# Patient Record
Sex: MALE | Race: WHITE | NOT HISPANIC OR LATINO | Employment: FULL TIME | URBAN - METROPOLITAN AREA
[De-identification: names, ages, dates, MRNs, and addresses within clinical notes are randomized per-mention and may not be internally consistent; named-entity substitution may affect disease eponyms.]

---

## 2017-07-08 ENCOUNTER — OFFICE VISIT (OUTPATIENT)
Dept: URGENT CARE | Facility: MEDICAL CENTER | Age: 57
End: 2017-07-08
Payer: COMMERCIAL

## 2017-07-08 PROCEDURE — 90715 TDAP VACCINE 7 YRS/> IM: CPT

## 2017-07-08 PROCEDURE — 12001 RPR S/N/AX/GEN/TRNK 2.5CM/<: CPT

## 2017-07-08 PROCEDURE — 99213 OFFICE O/P EST LOW 20 MIN: CPT

## 2017-07-15 ENCOUNTER — OFFICE VISIT (OUTPATIENT)
Dept: URGENT CARE | Facility: MEDICAL CENTER | Age: 57
End: 2017-07-15
Payer: COMMERCIAL

## 2017-07-15 PROCEDURE — 99213 OFFICE O/P EST LOW 20 MIN: CPT

## 2023-07-05 ENCOUNTER — OFFICE VISIT (OUTPATIENT)
Dept: URGENT CARE | Facility: CLINIC | Age: 63
End: 2023-07-05
Payer: COMMERCIAL

## 2023-07-05 VITALS
RESPIRATION RATE: 16 BRPM | TEMPERATURE: 97 F | DIASTOLIC BLOOD PRESSURE: 60 MMHG | HEART RATE: 69 BPM | SYSTOLIC BLOOD PRESSURE: 112 MMHG

## 2023-07-05 DIAGNOSIS — T30.0 BURN: Primary | ICD-10-CM

## 2023-07-05 DIAGNOSIS — Z23 NEED FOR TDAP VACCINATION: ICD-10-CM

## 2023-07-05 PROCEDURE — 16020 DRESS/DEBRID P-THICK BURN S: CPT | Performed by: NURSE PRACTITIONER

## 2023-07-05 PROCEDURE — 90715 TDAP VACCINE 7 YRS/> IM: CPT

## 2023-07-05 PROCEDURE — 90471 IMMUNIZATION ADMIN: CPT | Performed by: NURSE PRACTITIONER

## 2023-07-05 PROCEDURE — 99213 OFFICE O/P EST LOW 20 MIN: CPT | Performed by: NURSE PRACTITIONER

## 2023-07-05 RX ORDER — ESCITALOPRAM OXALATE 5 MG/1
5 TABLET ORAL
COMMUNITY
Start: 2023-05-04

## 2023-07-05 RX ORDER — CEPHALEXIN 500 MG/1
500 CAPSULE ORAL EVERY 8 HOURS SCHEDULED
Qty: 15 CAPSULE | Refills: 0 | Status: SHIPPED | OUTPATIENT
Start: 2023-07-05 | End: 2023-07-10

## 2023-07-05 RX ORDER — OMEPRAZOLE 40 MG/1
CAPSULE, DELAYED RELEASE ORAL
COMMUNITY
Start: 2023-05-13

## 2023-07-05 RX ORDER — ROSUVASTATIN CALCIUM 10 MG/1
TABLET, COATED ORAL
COMMUNITY

## 2023-07-05 NOTE — PROGRESS NOTES
[facility]        NAME: Diamond Linn is a 65 y.o. male  : 1960    MRN: [id number]  DATE: 2023  TIME: 12:00 PM    Assessment and Plan   Burn [T30.0]  1. Burn      cephalexin (KEFLEX) 500 mg capsule   2. Need for Tdap vaccination  TDAP VACCINE GREATER THAN OR EQUAL TO 8YO IM        --Bacitracin ointment + non-stick dressing applied. Patient Instructions     --Cover burn with OTC Bacitracin antibiotic ointment and non-stick dressing, changing daily, until scabbed over  --Protect area from further injury. --Fill and start oral antibiotic if increased redness, tenderness, swelling, unusual drainage immediately surrounding burn over the next 3-5 days    Chief Complaint     Chief Complaint   Patient presents with   • Burn     5 days ago burned L arm. Has been using burn cream but looks more red and irritated. History of Present Illness       Here with complaints of burn on left forearm. Occcurred 5 days ago after he accidentally came in contact with hot exhaust manifold while working on car. Applied ice immediately afterwards, washed eventually. Initial blister which has since started to partially scab. No drainage. Skin immediately around burned area seems to be looking a little more red the past few days, so wanted to get it checked just in case. Applying OTC lidocaine burn gel. Tdap 2017. Review of Systems   Review of Systems   Constitutional: Negative for fever. Skin: Positive for wound.          Current Medications       Current Outpatient Medications:   •  cephalexin (KEFLEX) 500 mg capsule, Take 1 capsule (500 mg total) by mouth every 8 (eight) hours for 5 days, Disp: 15 capsule, Rfl: 0  •  Cholecalciferol 50 MCG ( UT) TABS, Take 1 tablet by mouth every morning, Disp: , Rfl:   •  escitalopram (LEXAPRO) 5 mg tablet, Take 5 mg by mouth, Disp: , Rfl:   •  Multiple Vitamin (MULTIVITAMIN ADULT PO), Take 4 tablets by mouth 2 (two) times a day, Disp: , Rfl:   •  omeprazole (PriLOSEC) 40 MG capsule, TAKE 1 CAPSULE BY MOUTH BEFORE BREAKFAST, Disp: , Rfl:   •  rosuvastatin (Crestor) 10 MG tablet, Take by mouth, Disp: , Rfl:     Current Allergies     Allergies as of 07/05/2023 - Reviewed 07/05/2023   Allergen Reaction Noted   • Minocycline Hives 07/05/2023   • Other Syncope 07/05/2023            The following portions of the patient's history were reviewed and updated as appropriate: allergies, current medications, past family history, past medical history, past social history, past surgical history and problem list.     No past medical history on file. No past surgical history on file. No family history on file. Medications have been verified. Objective   /60   Pulse 69   Temp (!) 97 °F (36.1 °C) (Temporal)   Resp 16   No LMP for male patient. Physical Exam     Physical Exam  Pulmonary:      Effort: Pulmonary effort is normal.   Skin:     Findings: Erythema present. Comments: Ulnar aspect of left mid forearm with 1 x 3 cm linear, partial thickness burn, partially scabbed with residual blister intact around periphery, surrounding skin with mild erythema, induration, minimal tenderness extending 1-2 cm beyond burn. No drainage noted. Neurological:      Mental Status: He is alert.    Psychiatric:         Mood and Affect: Mood normal.

## 2023-07-05 NOTE — PATIENT INSTRUCTIONS
--Cover burn with OTC Bacitracin antibiotic ointment and non-stick dressing, changing daily, until scabbed over  --Protect area from further injury.    --Fill and start oral antibiotic if increased redness, tenderness, swelling, unusual drainage immediately surrounding burn over the next 3-5 days